# Patient Record
Sex: FEMALE | Race: OTHER | Employment: FULL TIME
[De-identification: names, ages, dates, MRNs, and addresses within clinical notes are randomized per-mention and may not be internally consistent; named-entity substitution may affect disease eponyms.]

---
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
## 2025-07-02 ENCOUNTER — OFFICE VISIT (OUTPATIENT)
Age: 20
End: 2025-07-02

## 2025-07-02 VITALS
HEIGHT: 63 IN | OXYGEN SATURATION: 98 % | BODY MASS INDEX: 31.01 KG/M2 | HEART RATE: 100 BPM | TEMPERATURE: 97.6 F | DIASTOLIC BLOOD PRESSURE: 78 MMHG | WEIGHT: 175 LBS | SYSTOLIC BLOOD PRESSURE: 126 MMHG

## 2025-07-02 DIAGNOSIS — J06.9 ACUTE URI: Primary | ICD-10-CM

## 2025-07-02 DIAGNOSIS — K21.9 GASTROESOPHAGEAL REFLUX DISEASE WITHOUT ESOPHAGITIS: ICD-10-CM

## 2025-07-02 RX ORDER — NORGESTIMATE AND ETHINYL ESTRADIOL 7DAYSX3 28
1 KIT ORAL DAILY
COMMUNITY
Start: 2025-06-20

## 2025-07-02 ASSESSMENT — ENCOUNTER SYMPTOMS
RHINORRHEA: 0
COUGH: 0
DIARRHEA: 0
NAUSEA: 1
SORE THROAT: 1
SHORTNESS OF BREATH: 0
VOMITING: 0
ABDOMINAL PAIN: 1

## 2025-07-02 NOTE — PROGRESS NOTES
Neurological:      General: No focal deficit present.      Mental Status: She is alert and oriented to person, place, and time.           No results found for this visit on 07/02/25.     No orders of the defined types were placed in this encounter.         ASSESSMENT/PLAN:    ICD-10-CM    1. Acute URI  J06.9       2. Gastroesophageal reflux disease without esophagitis  K21.9           Patient not interested in any testing today. States she has a history of GERD and has medication at home for management. Here for a work note.       Discussed exam, results, diagnosis, plan of care, and follow-up at length with patient.  Reviewed all prescribed and recommended medications, administration, and side effects. Encouraged patient to follow up with PCP or return to the clinic if symptoms fail to improve or worsen. All questions were answered. Verbalized understanding of discharge instructions.     An electronic signature was used to authenticate this note.    --Alice Childress PA-C